# Patient Record
Sex: FEMALE | Race: OTHER | HISPANIC OR LATINO
[De-identification: names, ages, dates, MRNs, and addresses within clinical notes are randomized per-mention and may not be internally consistent; named-entity substitution may affect disease eponyms.]

---

## 2022-05-13 PROBLEM — Z00.00 ENCOUNTER FOR PREVENTIVE HEALTH EXAMINATION: Status: ACTIVE | Noted: 2022-05-13

## 2022-05-16 ENCOUNTER — APPOINTMENT (OUTPATIENT)
Dept: SURGERY | Facility: CLINIC | Age: 52
End: 2022-05-16
Payer: MEDICAID

## 2022-05-16 VITALS — TEMPERATURE: 97.3 F

## 2022-05-16 VITALS
WEIGHT: 117 LBS | HEART RATE: 67 BPM | HEIGHT: 63 IN | SYSTOLIC BLOOD PRESSURE: 107 MMHG | BODY MASS INDEX: 20.73 KG/M2 | DIASTOLIC BLOOD PRESSURE: 67 MMHG

## 2022-05-16 DIAGNOSIS — N63.20 UNSPECIFIED LUMP IN THE LEFT BREAST, UNSPECIFIED QUADRANT: ICD-10-CM

## 2022-05-16 DIAGNOSIS — Z82.62 FAMILY HISTORY OF OSTEOPOROSIS: ICD-10-CM

## 2022-05-16 DIAGNOSIS — G40.909 EPILEPSY, UNSPECIFIED, NOT INTRACTABLE, W/OUT STATUS EPILEPTICUS: ICD-10-CM

## 2022-05-16 DIAGNOSIS — Z80.9 FAMILY HISTORY OF MALIGNANT NEOPLASM, UNSPECIFIED: ICD-10-CM

## 2022-05-16 DIAGNOSIS — Z83.511 FAMILY HISTORY OF GLAUCOMA: ICD-10-CM

## 2022-05-16 DIAGNOSIS — Z80.0 FAMILY HISTORY OF MALIGNANT NEOPLASM OF DIGESTIVE ORGANS: ICD-10-CM

## 2022-05-16 DIAGNOSIS — Z78.9 OTHER SPECIFIED HEALTH STATUS: ICD-10-CM

## 2022-05-16 DIAGNOSIS — E07.9 DISORDER OF THYROID, UNSPECIFIED: ICD-10-CM

## 2022-05-16 PROCEDURE — 99203 OFFICE O/P NEW LOW 30 MIN: CPT

## 2022-05-16 RX ORDER — LEVOTHYROXINE SODIUM 0.17 MG/1
TABLET ORAL
Refills: 0 | Status: ACTIVE | COMMUNITY

## 2022-05-16 RX ORDER — LEVETIRACETAM 1000 MG/1
TABLET, FILM COATED ORAL
Refills: 0 | Status: ACTIVE | COMMUNITY

## 2022-05-16 NOTE — PHYSICAL EXAM
[Alert] : alert [Oriented to Person] : oriented to person [Oriented to Place] : oriented to place [Oriented to Time] : oriented to time [Calm] : calm [de-identified] : She  is alert, well-groomed, and in NAD [de-identified] : anicteric.  Nasal mucosa pink, septum midline. Oral mucosa pink.  Tongue midline, Pharynx without exudates. [de-identified] : Neck supple. Trachea midline. Thyroid isthmus barely palpable, lobes not felt. [de-identified] : No chest deformity. Breast are symmetric, Normal contours. No nodules, masses, tenderness, or axillary or supraclavicular  adenopathy. No nipple discharge. no skin retraction \par

## 2022-05-16 NOTE — CONSULT LETTER
[Dear  ___] : Dear  [unfilled], [Consult Letter:] : I had the pleasure of evaluating your patient, [unfilled]. [Please see my note below.] : Please see my note below. [Sincerely,] : Sincerely, [FreeTextEntry3] : Lele Sarmiento MD, FACS

## 2022-05-16 NOTE — PLAN
[FreeTextEntry1] : Ms. BUCHANAN  is presenting  today for an evaluation .  she is doing well and offers no complaints.  Results of  her recent  imaging and physical examination findings were discussed in details.   She  was advised to have BL             breast US and Mammogram in  1 year and return after the tests. Importance of monthly self-breast examination was reinforced.  Patient's questions and concerns addressed to patient's satisfaction.\par \par Vitamin E daily for breast pain \par Avoid caffein and Chocolates to prevent breast pain

## 2022-05-16 NOTE — HISTORY OF PRESENT ILLNESS
[de-identified] : Patient is a 51 year -old female  who was referred by Dr. Gary Beard with the chief complaint of having a Left   breast mass and pain.  She  denies any trauma and She has no nipple discharge. She  denies any fever, night sweats or loss of appetite.    There is no family history of breast carcinoma.   Menarche at age 14 -3 para-2 and her last menstrual period was in 2022. Patient is on no hormonal replacement therapy.  \par  Patient  had a  Left mammogram and BL US  on 2022 that was deemed a BIRADS 2.

## 2023-02-16 ENCOUNTER — APPOINTMENT (OUTPATIENT)
Dept: ENDOCRINOLOGY | Facility: CLINIC | Age: 53
End: 2023-02-16
Payer: MEDICAID

## 2023-02-16 PROCEDURE — 99205 OFFICE O/P NEW HI 60 MIN: CPT

## 2023-02-16 NOTE — REVIEW OF SYSTEMS
[Recent Weight Loss (___ Lbs)] : recent weight loss: [unfilled] lbs [Fatigue] : no fatigue [Blurred Vision] : no blurred vision [Neck Pain] : no neck pain [Chest Pain] : no chest pain [Slow Heart Rate] : heart rate is not slow [Palpitations] : no palpitations [Fast Heart Rate] : heart rate is not fast [Lower Ext Edema] : no lower extremity edema [Shortness Of Breath] : no shortness of breath [Cough] : no cough [SOB on Exertion] : no shortness of breath on exertion [Nausea] : no nausea [Constipation] : no constipation [Abdominal Pain] : no abdominal pain [Vomiting] : no vomiting [Polyuria] : no polyuria [Irregular Menses] : regular menses [Joint Pain] : no joint pain [Muscle Weakness] : no muscle weakness [Myalgia] : no myalgia  [Acanthosis] : no acanthosis  [Acne] : no acne [Dry Skin] : no dry skin [Hirsutism] : no hirsutism [Hair Loss] : no hair loss [Headaches] : no headaches [Dizziness] : no dizziness [Confusion] : no confusion [Tremors] : no tremors [Pain/Numbness of Digits] : no pain/numbness of digits [Depression] : no depression [Polydipsia] : no polydipsia [Cold Intolerance] : no cold intolerance [Heat Intolerance] : no heat intolerance [Hot Flashes] : no hot flashes

## 2023-02-16 NOTE — PHYSICAL EXAM
[Alert] : alert [Healthy Appearance] : healthy appearance [No Acute Distress] : no acute distress [Normal Sclera/Conjunctiva] : normal sclera/conjunctiva [No Neck Mass] : no neck mass was observed [No LAD] : no lymphadenopathy [Supple] : the neck was supple [Thyroid Not Enlarged] : the thyroid was not enlarged [No Respiratory Distress] : no respiratory distress [Normal Rate and Effort] : normal respiratory rate and effort [Clear to Auscultation] : lungs were clear to auscultation bilaterally [Normal Rate] : heart rate was normal [Not Tender] : non-tender [Not Distended] : not distended [Soft] : abdomen soft [No HSM] : no hepato-splenomegaly [No Clubbing, Cyanosis] : no clubbing  or cyanosis of the fingernails [No Joint Swelling] : no joint swelling seen [No Rash] : no rash [No Skin Lesions] : no skin lesions [No Motor Deficits] : the motor exam was normal [Normal Reflexes] : deep tendon reflexes were 2+ and symmetric [No Tremors] : no tremors [Oriented x3] : oriented to person, place, and time [de-identified] : Thyroid is not palpable

## 2023-02-16 NOTE — ASSESSMENT
[FreeTextEntry1] : Check TFTs and adjust the levothyroxine dose based upon the lab tests\par Check TPO antibody to rule out Hashimoto's disease\par Thyroid nodule was subcentimeter in size in 2018, referral to Dr. Funk for repeat U/S with/without FNA biopsy\par Continue Levothyroxine 25 mcg daily, she takes with appropriate technique.\par \par RTC in 3 months

## 2023-02-16 NOTE — HISTORY OF PRESENT ILLNESS
[FreeTextEntry1] : 52 year old Female with PMH of left breast mass, hypothyroidism, thyroid nodule, epilepsy, vitamin D deficiency came for evaluation of hypothyroidism and thyroid nodule\par \par Brought consult note from prior Endocrinologist. \par \par Thyroid history:  \par Diagnosed with hypothyroidism >10 years ( 2016) when she went to gynecologist and was found TFTs abnormal \par Current meds: Levothyroxine 25 mg daily, takes in morning in an empty stomach separate from other medications and wait for 30 minutes to eat. \par \par Thyroid nodule:\par  Has hx of 0.8 cm thyroid nodule, Last neck U/S 10/16 showing R thyroid nodule 7mm, Repeat Thyroid U/S 2/18 shows stable findings. No FNA was done at that time. \par \par LMP: Jan 25 2023, menstrual cycle is regular \par ROS:\par no weight gain\par yes  weight loss\par no fatigue\par yes hair loss as she was using epilepsy medication\par no skin or nail changes\par yes constipation\par No personal and family hx of thyroid cancer

## 2023-02-17 LAB
ALBUMIN SERPL ELPH-MCNC: 4.3 G/DL
ALP BLD-CCNC: 76 U/L
ALT SERPL-CCNC: 16 U/L
ANION GAP SERPL CALC-SCNC: 11 MMOL/L
AST SERPL-CCNC: 25 U/L
BILIRUB SERPL-MCNC: 0.9 MG/DL
BUN SERPL-MCNC: 13 MG/DL
CALCIUM SERPL-MCNC: 9.2 MG/DL
CHLORIDE SERPL-SCNC: 102 MMOL/L
CO2 SERPL-SCNC: 23 MMOL/L
CREAT SERPL-MCNC: 0.67 MG/DL
EGFR: 106 ML/MIN/1.73M2
GLUCOSE SERPL-MCNC: 81 MG/DL
POTASSIUM SERPL-SCNC: 4.6 MMOL/L
PROT SERPL-MCNC: 7.3 G/DL
SODIUM SERPL-SCNC: 137 MMOL/L
T3 SERPL-MCNC: 88 NG/DL
T4 FREE SERPL-MCNC: 1 NG/DL
THYROPEROXIDASE AB SERPL IA-ACNC: 1233 IU/ML
TSH SERPL-ACNC: 4.02 UIU/ML

## 2023-02-19 LAB — TSI ACT/NOR SER: <0.1 IU/L

## 2023-04-05 ENCOUNTER — APPOINTMENT (OUTPATIENT)
Dept: SURGERY | Facility: CLINIC | Age: 53
End: 2023-04-05
Payer: MEDICAID

## 2023-04-05 VITALS
OXYGEN SATURATION: 99 % | BODY MASS INDEX: 21.53 KG/M2 | HEIGHT: 62.5 IN | HEART RATE: 67 BPM | SYSTOLIC BLOOD PRESSURE: 106 MMHG | DIASTOLIC BLOOD PRESSURE: 71 MMHG | TEMPERATURE: 97.5 F | WEIGHT: 120 LBS

## 2023-04-05 DIAGNOSIS — Z56.0 UNEMPLOYMENT, UNSPECIFIED: ICD-10-CM

## 2023-04-05 PROCEDURE — 99214 OFFICE O/P EST MOD 30 MIN: CPT

## 2023-04-05 RX ORDER — ACETAMINOPHEN 325 MG
TABLET ORAL
Refills: 0 | Status: COMPLETED | COMMUNITY
End: 2023-04-05

## 2023-04-05 RX ORDER — VITAMIN E ACID SUCCINATE 268 MG
TABLET ORAL
Refills: 0 | Status: COMPLETED | COMMUNITY
End: 2023-04-05

## 2023-04-05 RX ORDER — CHROMIUM 200 MCG
TABLET ORAL
Refills: 0 | Status: COMPLETED | COMMUNITY
End: 2023-04-05

## 2023-04-05 SDOH — ECONOMIC STABILITY - INCOME SECURITY: UNEMPLOYMENT, UNSPECIFIED: Z56.0

## 2023-04-05 NOTE — PHYSICAL EXAM
[Normal Breath Sounds] : Normal breath sounds [Normal Heart Sounds] : normal heart sounds [Normal Rate and Rhythm] : normal rate and rhythm [No Rash or Lesion] : No rash or lesion [Alert] : alert [Oriented to Person] : oriented to person [Oriented to Place] : oriented to place [Oriented to Time] : oriented to time [Calm] : calm [de-identified] : The patient is alert, well-groomed  [de-identified] : Neck Trachea midline, no palpable thyroid nodules, no palpable lymphadenopathy, no thyroid bruit.  [de-identified] : full range of motion and no deformities appreciated.

## 2023-04-05 NOTE — HISTORY OF PRESENT ILLNESS
[de-identified] : JORGE BUCHANAN is a 52 year old female with PMHx of left breast mass, hypothyroidism, thyroid nodule, epilepsy, vitamin D deficiency who present in the office for initial consultation. Pt [place] speaking, use of audio  during visit.  Patient was referred by Dr. Lorenzo with chief complaint of  Thyroid nodule. The hypothyroidism was diagnosed over 10 years and the patient is currently taking Synthroid.  The patient denies any compressive like symptoms. On questioning the patient denied dysphagia, SOB, cough and voice changes. The patient has Family history of thyroid problem that was treated with medication (hypothyroid)  denies any history of thyroid cancer (sister and niece)  had thyroid problem that was treated with medication; denies any history of thyroid cancer. The patient is not a spence. On review pt has markedly elevated TPO antibodies c/w Hashimoto's thyroiditis. Review of note from endocrinology states pt with hx of thyroid nodule in 2018 0.8 cm. \par

## 2023-04-05 NOTE — CONSULT LETTER
[Dear  ___] : Dear  [unfilled], [Consult Letter:] : I had the pleasure of evaluating your patient, [unfilled]. [Consult Closing:] : Thank you very much for allowing me to participate in the care of this patient.  If you have any questions, please do not hesitate to contact me. [Sincerely,] : Sincerely, [FreeTextEntry1] : In-office ultrasound demonstrated no evidence of discrete thyroid nodule meeting criteria for biopsy. Diffuse heterogeneity of thyroid seen consistent with history of Hashimoto's thyroiditis.  [FreeTextEntry3] : Ely Funk MD FACS

## 2023-04-05 NOTE — PLAN
[FreeTextEntry1] : No discrete thyroid nodules seen on in-office thyroid ultrasound which meet criteria for biopsy\par \par Follow up with Dr Lorenzo for management of hypothyroidism\par \par Thyroid US to record images for later comparison\par \par Please follow up at the office  as needed for any questions or concerns

## 2023-04-05 NOTE — ASSESSMENT
[FreeTextEntry1] : JORGE BUCHANAN is a 52 year old female with Thyroid nodules that was seeing in 2018\par \par Thyroid Ultrasound \par \par Right Lobe there is diffuse slight heterogeneity,  no discrete nodule visualized when examining in axial and sagittal views.\par \par Left lobe: defused heterogeneity of left lobe  \par \par No Suspicious lymph nodes seen in bilateral lateral neck \par \par Right lateral neck no pathologic appearing lymph nodes, see morphologically normal LN level 2\par \par Left lateral neck no pathologic appearing lymph nodes. \par \par No discrete thyroid nodules for biopsy based on today's exam. No suspicious lymph nodes visualized which warrant biopsy.  No need for FNA based on today's scan.  \par

## 2023-05-26 ENCOUNTER — APPOINTMENT (OUTPATIENT)
Dept: ENDOCRINOLOGY | Facility: CLINIC | Age: 53
End: 2023-05-26

## 2023-06-01 ENCOUNTER — NON-APPOINTMENT (OUTPATIENT)
Age: 53
End: 2023-06-01

## 2023-06-05 ENCOUNTER — NON-APPOINTMENT (OUTPATIENT)
Age: 53
End: 2023-06-05

## 2023-06-22 ENCOUNTER — NON-APPOINTMENT (OUTPATIENT)
Age: 53
End: 2023-06-22

## 2023-06-23 RX ORDER — LEVOTHYROXINE SODIUM 0.03 MG/1
25 TABLET ORAL DAILY
Qty: 90 | Refills: 0 | Status: ACTIVE | COMMUNITY
Start: 2023-06-23 | End: 1900-01-01

## 2023-08-17 ENCOUNTER — APPOINTMENT (OUTPATIENT)
Age: 53
End: 2023-08-17
Payer: MEDICAID

## 2023-08-17 VITALS
DIASTOLIC BLOOD PRESSURE: 60 MMHG | WEIGHT: 124 LBS | BODY MASS INDEX: 21.97 KG/M2 | HEART RATE: 62 BPM | HEIGHT: 63 IN | SYSTOLIC BLOOD PRESSURE: 95 MMHG

## 2023-08-17 PROCEDURE — 99213 OFFICE O/P EST LOW 20 MIN: CPT

## 2023-08-17 NOTE — PLAN
[FreeTextEntry1] : Ms. BUCHANAN  is presenting  today for an evaluation .  she is doing well and offers no complaints.  Results of  her last   imaging and physical examination findings were discussed in details.    She  was advised to have BL    breast US and Mammogram in  April 2024 and return after the tests. Importance of monthly self-breast examination was reinforced.  Patient's questions and concerns addressed to patient's satisfaction.       Vitamin E daily for breast pain.   Avoid caffein and Chocolates to prevent breast pain NSAIDs PRN for pain

## 2023-08-17 NOTE — DATA REVIEWED
[FreeTextEntry1] : Exam requested by: [facility] [address] [place] [address] SITE PERFORMED: [facility] PHONE: [phone] Patient: JORGE BUCHANAN YOB: 1970 Phone: [phone] MRN: [id number] Acc: [id number] Date of Exam: 04-   EXAM: DIGITAL BILATERAL SCREENING MAMMOGRAM WITH CAD AND TOMOSYNTHESIS AND BREAST ULTRASOUND  HISTORY: The patient is 52 years old and is seen for a screening mammogram.  CLINICAL BREAST EXAMINATION: The patient does not recall when she had her last clinical breast exam.   COMPARISON: The present examination has been compared to prior breast imaging studies dating back to 4/7/2022  MAMMOGRAM:  TECHNIQUE: The following views were obtained digitally: bilateral craniocaudal, bilateral mediolateral oblique. Low-dose full-field digital breast tomosynthesis examination was performed with tomosynthesis acquisitions and synthesized 2D reconstructed mammogram. Computer-aided detection was applied.  FINDINGS:  BREAST COMPOSITION: The breasts are heterogeneously dense, which may obscure small masses.  No suspicious masses, architectural distortion, or significant calcifications are detected.  BREAST ULTRASOUND:  HISTORY: Supplemental evaluation of dense breasts.   TECHNIQUE: A bilateral breast ultrasound was performed with complete evaluation of the four quadrants, retroareolar region, and axilla.   FINDINGS:  The right 12:00 position, 2 cm from the nipple, demonstrated a 3 x 1 x 2 mm complicated cyst.  The left 7:00 position, 3 cm from the nipple, demonstrated a 6 x 2 x 5 mm complicated cyst.   No suspicious solid or complex cystic mass is detected in either breast. There is no lymphadenopathy in the axillae.  IMPRESSION: No mammographic or ultrasonographic evidence of malignancy.   FOLLOW-UP: Annual screening.   ASSESSMENT: BI-RADS Category 2:  Benign.   This examination should not preclude the clinical evaluation of a suspicious palpable abnormality.  As per the FDA requirements, a layman's letter has been generated and sent to your patient stating the results and recommendations of this breast imaging study. We have entered your patient into our reminder system and will notify them when they are due for their next breast imaging exam.   Thank you for the opportunity to participate in the care of this patient.     Yina Wilcox MD  - Electronically Signed: 04- 7:07 AM  Physician to Physician Direct Line is: [phone]

## 2023-08-17 NOTE — HISTORY OF PRESENT ILLNESS
[de-identified] : This is  a 53 year   old patient presenting today for a breast exam and to discuss the results of her recent  breast imaging. Patient had a BL breast US and   BL breast Mammogram on 2023. .  Deemed BIRADS category 2.  Ms. BUCHANAN reporting occasional left breast pain. She  denies any trauma to the breast, denies  skin changes  and   she has no spontaneous nipple discharge. Patient denies any fever, night sweats or loss of appetite.    Her last menstrual period was 2 months ago. .    PERTINENT HISTORY:  There is no family history of breast carcinoma. Menarche at age 14 -3 para-2 .  Patient is on no hormonal replacement therapy.  [de-identified] : Patient reports no interval changes to her overall health status or medical history

## 2023-08-17 NOTE — PHYSICAL EXAM
[Alert] : alert [Oriented to Person] : oriented to person [Oriented to Place] : oriented to place [Oriented to Time] : oriented to time [Calm] : calm [de-identified] : She  is alert, well-groomed, and in NAD [de-identified] : anicteric.  Nasal mucosa pink, septum midline. Oral mucosa pink.  Tongue midline, Pharynx without exudates. [de-identified] : Neck supple. Trachea midline. Thyroid isthmus barely palpable, lobes not felt. [de-identified] : No chest deformity. Breast are symmetric, Normal contours. No nodules, masses, tenderness, or axillary or supraclavicular  adenopathy. No nipple discharge. no skin retraction \par

## 2024-02-23 ENCOUNTER — RX RENEWAL (OUTPATIENT)
Age: 54
End: 2024-02-23

## 2024-02-23 RX ORDER — LEVOTHYROXINE SODIUM 0.03 MG/1
25 TABLET ORAL
Qty: 90 | Refills: 1 | Status: ACTIVE | COMMUNITY
Start: 2023-06-22 | End: 1900-01-01

## 2024-04-04 ENCOUNTER — APPOINTMENT (OUTPATIENT)
Dept: ENDOCRINOLOGY | Facility: CLINIC | Age: 54
End: 2024-04-04
Payer: MEDICAID

## 2024-04-04 VITALS
DIASTOLIC BLOOD PRESSURE: 65 MMHG | RESPIRATION RATE: 16 BRPM | HEIGHT: 63 IN | TEMPERATURE: 97.4 F | HEART RATE: 60 BPM | WEIGHT: 118 LBS | OXYGEN SATURATION: 97 % | SYSTOLIC BLOOD PRESSURE: 99 MMHG | BODY MASS INDEX: 20.91 KG/M2

## 2024-04-04 DIAGNOSIS — E04.1 NONTOXIC SINGLE THYROID NODULE: ICD-10-CM

## 2024-04-04 PROCEDURE — 99214 OFFICE O/P EST MOD 30 MIN: CPT

## 2024-04-04 NOTE — HISTORY OF PRESENT ILLNESS
[FreeTextEntry1] : Used  ID: [id number]  53 year old Female with PMH of left breast mass, hypothyroidism, thyroid nodule, epilepsy, vitamin D deficiency came    for hypothyroidism and thyroid nodule follow up  Thyroid history: Diagnosed with hypothyroidism >10 years ( 2016) when she went to gynecologist and was found TFTs abnormal Current meds: Levothyroxine 25 mg daily, takes in morning in an empty stomach separate from other medications and wait for 30 minutes to eat.  ROS:  no weight gain  no fatigue  yes abnormal menses - going in menopause, LMP: Feb 9, 2024 yes constipation yes hair loss  Thyroid nodule: Has hx of 0.8 cm thyroid nodule, Last neck U/S 10/16 showing R thyroid nodule 7mm, Repeat Thyroid U/S 2/18 shows stable findings. No FNA was done at that time. Patient had a repeat thyroid ultrasound in May 2023, that showed no thyroid nodules and thyroid gland was wnl  No personal and family hx of thyroid cancer

## 2024-04-04 NOTE — ASSESSMENT
[FreeTextEntry1] : Positive TPO antibody indicating Hashimoto's disease Check TFTs and adjust the levothyroxine dose based upon the lab tests  Thyroid nodule was subcentimeter in size in 2018, Most recent Thyroid ultrasound in May 2023, showed no thyroid nodules. US in 2  years now  Continue Levothyroxine 25 mcg daily, she takes with appropriate technique.    RTC in 1 year

## 2024-04-04 NOTE — PHYSICAL EXAM
[Alert] : alert [Healthy Appearance] : healthy appearance [No Acute Distress] : no acute distress [Normal Sclera/Conjunctiva] : normal sclera/conjunctiva [No Neck Mass] : no neck mass was observed [No LAD] : no lymphadenopathy [Supple] : the neck was supple [Thyroid Not Enlarged] : the thyroid was not enlarged [No Respiratory Distress] : no respiratory distress [Normal Rate and Effort] : normal respiratory rate and effort [Clear to Auscultation] : lungs were clear to auscultation bilaterally [Normal Rate] : heart rate was normal [Not Tender] : non-tender [Not Distended] : not distended [Soft] : abdomen soft [No HSM] : no hepato-splenomegaly [No Clubbing, Cyanosis] : no clubbing  or cyanosis of the fingernails [No Joint Swelling] : no joint swelling seen [No Rash] : no rash [No Skin Lesions] : no skin lesions [No Motor Deficits] : the motor exam was normal [Normal Reflexes] : deep tendon reflexes were 2+ and symmetric [No Tremors] : no tremors [Oriented x3] : oriented to person, place, and time [de-identified] : Thyroid is not palpable

## 2024-04-08 ENCOUNTER — NON-APPOINTMENT (OUTPATIENT)
Age: 54
End: 2024-04-08

## 2024-04-08 ENCOUNTER — APPOINTMENT (OUTPATIENT)
Dept: ENDOCRINOLOGY | Facility: CLINIC | Age: 54
End: 2024-04-08
Payer: MEDICAID

## 2024-04-08 DIAGNOSIS — E06.3 AUTOIMMUNE THYROIDITIS: ICD-10-CM

## 2024-04-08 DIAGNOSIS — E03.9 HYPOTHYROIDISM, UNSPECIFIED: ICD-10-CM

## 2024-04-08 LAB
ALBUMIN SERPL ELPH-MCNC: 4.7 G/DL
ALP BLD-CCNC: 71 U/L
ALT SERPL-CCNC: 12 U/L
ANION GAP SERPL CALC-SCNC: 9 MMOL/L
AST SERPL-CCNC: 19 U/L
BILIRUB SERPL-MCNC: 0.9 MG/DL
BUN SERPL-MCNC: 25 MG/DL
CALCIUM SERPL-MCNC: 9.7 MG/DL
CHLORIDE SERPL-SCNC: 102 MMOL/L
CO2 SERPL-SCNC: 27 MMOL/L
CREAT SERPL-MCNC: 0.71 MG/DL
EGFR: 103 ML/MIN/1.73M2
ESTIMATED AVERAGE GLUCOSE: 111 MG/DL
GLUCOSE SERPL-MCNC: 84 MG/DL
HBA1C MFR BLD HPLC: 5.5 %
POTASSIUM SERPL-SCNC: 4.8 MMOL/L
PROT SERPL-MCNC: 7.6 G/DL
SODIUM SERPL-SCNC: 138 MMOL/L
T4 FREE SERPL-MCNC: 1 NG/DL
TSH SERPL-ACNC: 4.26 UIU/ML

## 2024-04-08 PROCEDURE — 99442: CPT

## 2024-05-06 ENCOUNTER — APPOINTMENT (OUTPATIENT)
Dept: SURGERY | Facility: CLINIC | Age: 54
End: 2024-05-06
Payer: MEDICAID

## 2024-05-06 VITALS
BODY MASS INDEX: 20.91 KG/M2 | OXYGEN SATURATION: 99 % | HEIGHT: 63 IN | WEIGHT: 118 LBS | HEART RATE: 52 BPM | DIASTOLIC BLOOD PRESSURE: 66 MMHG | SYSTOLIC BLOOD PRESSURE: 101 MMHG

## 2024-05-06 DIAGNOSIS — Z12.39 ENCOUNTER FOR OTHER SCREENING FOR MALIGNANT NEOPLASM OF BREAST: ICD-10-CM

## 2024-05-06 PROCEDURE — 99213 OFFICE O/P EST LOW 20 MIN: CPT

## 2024-05-06 NOTE — PHYSICAL EXAM
[Alert] : alert [Oriented to Person] : oriented to person [Oriented to Place] : oriented to place [Oriented to Time] : oriented to time [Calm] : calm [de-identified] : She  is alert, well-groomed, and in NAD [de-identified] : anicteric.  Nasal mucosa pink, septum midline. Oral mucosa pink.  Tongue midline, Pharynx without exudates. [de-identified] : Neck supple. Trachea midline. Thyroid isthmus barely palpable, lobes not felt. [de-identified] : No chest deformity. Breast are symmetric, Normal contours. No nodules, masses, tenderness, or axillary or supraclavicular  adenopathy. No nipple discharge. no skin retraction \par

## 2024-05-06 NOTE — PLAN
[FreeTextEntry1] : Ms. BUCHANAN  is presenting  today for an evaluation .  she is doing well and offers no complaints.  Results of  her last   imaging and physical examination findings were discussed in details.  Significance of the findings were discussed.    She  was advised to have  BL    breast US and Mammogram in  April 2025 and return after the tests. Importance of monthly self-breast examination was reinforced.  Patient's questions and concerns addressed to patient's satisfaction.    Vitamin E daily for breast pain.  Avoid caffein and Chocolates to prevent breast pain NSAIDs PRN for pain

## 2024-05-06 NOTE — HISTORY OF PRESENT ILLNESS
[de-identified] : This is  a 53 year   old patient presenting today for a breast exam and to discuss the results of her recent  breast imaging. Patient had a BL breast US and   BL breast Mammogram on 2024 .  Deemed BIRADS category 2.   Patient reports no interval changes to her overall health status or medical history.   she reports occasional  breast pain.   Ms. BUCHANAN denies any trauma to the breast, denies  skin changes  and   she has no spontaneous nipple discharge. Patient denies any fever, night sweats or loss of appetite.       There is no family history of breast carcinoma.   Patient is on no hormonal replacement therapy.  PERTINENT HISTORY:   Menarche at age 14 -3 para-2.

## 2024-05-06 NOTE — DATA REVIEWED
[FreeTextEntry1] :  ESCOBAR MAYER MD [id number] [facility] [address] [address] [facility] [address] SITE PERFORMED: [facility] PHONE: [phone] Patient: JORGE BUCHANAN YOB: 1970 Phone: [phone] MRN: [id number] Acc: [id number] Date of Exam: 04-   EXAM: DIGITAL BILATERAL SCREENING MAMMOGRAM WITH CAD AND TOMOSYNTHESIS AND BREAST ULTRASOUND  HISTORY: The patient is 53 years old and is seen for a screening mammogram.    CLINICAL BREAST EXAMINATION: The patient reports that her last clinical breast exam was within the past year.   COMPARISON: The present examination has been compared to prior breast imaging studies dating back to 2022.  MAMMOGRAM:  TECHNIQUE: The following views were obtained digitally: bilateral craniocaudal, bilateral mediolateral oblique. Low-dose full-field digital breast tomosynthesis examination was performed with tomosynthesis acquisitions and synthesized 2D reconstructed mammogram. Computer-aided detection was applied.  FINDINGS:  BREAST COMPOSITION: The breasts are heterogeneously dense, which may obscure small masses.  No suspicious masses, architectural distortion, or significant calcifications are detected. Multiple bilateral circumscribed masses are noted in each breast, supporting a benign nature.   BREAST ULTRASOUND:  HISTORY: Supplemental screening evaluation of dense breasts.   TECHNIQUE: A bilateral breast ultrasound was performed with complete evaluation of the four quadrants, retroareolar region, and axilla.   FINDINGS:    No suspicious solid or complex cystic mass is detected in either breast. No morphologically abnormal axillary lymph nodes are detected.  IMPRESSION: No mammographic or ultrasonographic evidence of malignancy. No significant change.   FOLLOW-UP: Annual screening.   ASSESSMENT: BI-RADS Category 2:  Benign.   This examination should not preclude the clinical evaluation of a suspicious palpable abnormality.  As per the FDA requirements, a layman's letter has been generated and sent to your patient stating the results and recommendations of this breast imaging study. We have entered your patient into our reminder system and will notify them when they are due for their next breast imaging exam.   Thank you for the opportunity to participate in the care of this patient.     YOSEF AGUIRRE MD  - Electronically Signed: 04- 8:59 PM  Physician to Physician Direct Line is: [phone] Copy to:SABINA LUGO MD

## 2024-05-08 LAB
T4 FREE SERPL-MCNC: 1 NG/DL
TSH SERPL-ACNC: 2.55 UIU/ML

## 2024-08-15 ENCOUNTER — RX RENEWAL (OUTPATIENT)
Age: 54
End: 2024-08-15

## 2024-08-22 ENCOUNTER — APPOINTMENT (OUTPATIENT)
Dept: ENDOCRINOLOGY | Facility: CLINIC | Age: 54
End: 2024-08-22
Payer: MEDICAID

## 2024-08-22 VITALS
HEART RATE: 65 BPM | SYSTOLIC BLOOD PRESSURE: 104 MMHG | HEIGHT: 63 IN | TEMPERATURE: 98.1 F | WEIGHT: 117 LBS | OXYGEN SATURATION: 98 % | RESPIRATION RATE: 16 BRPM | BODY MASS INDEX: 20.73 KG/M2 | DIASTOLIC BLOOD PRESSURE: 66 MMHG

## 2024-08-22 DIAGNOSIS — E04.1 NONTOXIC SINGLE THYROID NODULE: ICD-10-CM

## 2024-08-22 DIAGNOSIS — E06.3 AUTOIMMUNE THYROIDITIS: ICD-10-CM

## 2024-08-22 PROCEDURE — 99214 OFFICE O/P EST MOD 30 MIN: CPT

## 2024-08-26 LAB
ALBUMIN SERPL ELPH-MCNC: 4.5 G/DL
ALP BLD-CCNC: 84 U/L
ALT SERPL-CCNC: 24 U/L
ANION GAP SERPL CALC-SCNC: 11 MMOL/L
AST SERPL-CCNC: 33 U/L
BILIRUB SERPL-MCNC: 0.7 MG/DL
BUN SERPL-MCNC: 22 MG/DL
CALCIUM SERPL-MCNC: 10 MG/DL
CHLORIDE SERPL-SCNC: 102 MMOL/L
CHOLEST SERPL-MCNC: 283 MG/DL
CO2 SERPL-SCNC: 25 MMOL/L
CREAT SERPL-MCNC: 0.62 MG/DL
EGFR: 107 ML/MIN/1.73M2
GLUCOSE SERPL-MCNC: 85 MG/DL
HDLC SERPL-MCNC: 80 MG/DL
LDLC SERPL CALC-MCNC: 193 MG/DL
NONHDLC SERPL-MCNC: 203 MG/DL
POTASSIUM SERPL-SCNC: 4.6 MMOL/L
PROT SERPL-MCNC: 7.4 G/DL
SODIUM SERPL-SCNC: 138 MMOL/L
T4 FREE SERPL-MCNC: 1 NG/DL
TRIGL SERPL-MCNC: 66 MG/DL
TSH SERPL-ACNC: 3.58 UIU/ML

## 2024-08-26 NOTE — ASSESSMENT
[FreeTextEntry1] : Check TFTs and adjust the levothyroxine dose based upon the lab tests  Thyroid nodule was subcentimeter in size in 2018, Most recent Thyroid ultrasound in May 2023, showed no thyroid nodules. US in 2 years now Continue Levothyroxine 25 mcg daily, she takes with appropriate technique.  Patient also wants to check her lipid profile  RTC in 1 year.

## 2024-08-26 NOTE — HISTORY OF PRESENT ILLNESS
[FreeTextEntry1] : Used  ID: [id number]  54 year old Female with PMH of left breast mass, hypothyroidism, thyroid nodule, epilepsy, vitamin D deficiency came for hypothyroidism and thyroid nodule follow up  Thyroid history: Diagnosed with hypothyroidism >10 years ( 2016) when she went to gynecologist and was found TFTs abnormal Current meds: Levothyroxine 25 mg daily, takes in morning in an empty stomach separate from other medications and wait for 30 minutes to eat.  ROS: no weight gain no fatigue yes abnormal menses - going in menopause, LMP: Feb 9, 2024 yes constipation yes hair loss  Thyroid nodule: Has hx of 0.8 cm thyroid nodule, Last neck U/S 10/16 showing R thyroid nodule 7mm, Repeat Thyroid U/S 2/18 shows stable findings. No FNA was done at that time. Patient had a repeat thyroid ultrasound in May 2023, that showed no thyroid nodules and thyroid gland was wnl  No personal and family hx of thyroid cancer

## 2024-08-26 NOTE — PHYSICAL EXAM
[Alert] : alert [Healthy Appearance] : healthy appearance [No Acute Distress] : no acute distress [Normal Sclera/Conjunctiva] : normal sclera/conjunctiva [No Neck Mass] : no neck mass was observed [No LAD] : no lymphadenopathy [Supple] : the neck was supple [Thyroid Not Enlarged] : the thyroid was not enlarged [No Respiratory Distress] : no respiratory distress [Normal Rate and Effort] : normal respiratory rate and effort [Clear to Auscultation] : lungs were clear to auscultation bilaterally [Normal Rate] : heart rate was normal [Not Tender] : non-tender [Not Distended] : not distended [Soft] : abdomen soft [No HSM] : no hepato-splenomegaly [No Clubbing, Cyanosis] : no clubbing  or cyanosis of the fingernails [No Joint Swelling] : no joint swelling seen [No Rash] : no rash [No Skin Lesions] : no skin lesions [No Motor Deficits] : the motor exam was normal [Normal Reflexes] : deep tendon reflexes were 2+ and symmetric [No Tremors] : no tremors [Oriented x3] : oriented to person, place, and time [de-identified] : Thyroid is not palpable

## 2024-08-26 NOTE — HISTORY OF PRESENT ILLNESS
[FreeTextEntry1] : Used  ID: [id number]  54 year old Female with PMH of left breast mass, hypothyroidism, thyroid nodule, epilepsy, vitamin D deficiency came for hypothyroidism and thyroid nodule follow up  Thyroid history: Diagnosed with hypothyroidism >10 years ( 2016) when she went to gynecologist and was found TFTs abnormal Current meds: Levothyroxine 25 mg daily, takes in morning in an empty stomach separate from other medications and wait for 30 minutes to eat.  ROS: no weight gain no fatigue yes abnormal menses - going in menopause, LMP: Feb 9, 2024 yes constipation yes hair loss  Thyroid nodule: Has hx of 0.8 cm thyroid nodule, Last neck U/S 10/16 showing R thyroid nodule 7mm, Repeat Thyroid U/S 2/18 shows stable findings. No FNA was done at that time. Patient had a repeat thyroid ultrasound in May 2023, that showed no thyroid nodules and thyroid gland was wnl  No personal and family hx of thyroid cancer

## 2024-08-26 NOTE — PHYSICAL EXAM
[Alert] : alert [Healthy Appearance] : healthy appearance [No Acute Distress] : no acute distress [Normal Sclera/Conjunctiva] : normal sclera/conjunctiva [No Neck Mass] : no neck mass was observed [No LAD] : no lymphadenopathy [Supple] : the neck was supple [Thyroid Not Enlarged] : the thyroid was not enlarged [No Respiratory Distress] : no respiratory distress [Normal Rate and Effort] : normal respiratory rate and effort [Clear to Auscultation] : lungs were clear to auscultation bilaterally [Normal Rate] : heart rate was normal [Not Tender] : non-tender [Not Distended] : not distended [Soft] : abdomen soft [No HSM] : no hepato-splenomegaly [No Clubbing, Cyanosis] : no clubbing  or cyanosis of the fingernails [No Joint Swelling] : no joint swelling seen [No Rash] : no rash [No Skin Lesions] : no skin lesions [No Motor Deficits] : the motor exam was normal [Normal Reflexes] : deep tendon reflexes were 2+ and symmetric [No Tremors] : no tremors [Oriented x3] : oriented to person, place, and time [de-identified] : Thyroid is not palpable